# Patient Record
Sex: FEMALE | HISPANIC OR LATINO | URBAN - METROPOLITAN AREA
[De-identification: names, ages, dates, MRNs, and addresses within clinical notes are randomized per-mention and may not be internally consistent; named-entity substitution may affect disease eponyms.]

---

## 2017-11-09 ENCOUNTER — EMERGENCY (EMERGENCY)
Facility: HOSPITAL | Age: 32
LOS: 1 days | Discharge: OTHER INSTITUTION | End: 2017-11-09
Attending: EMERGENCY MEDICINE
Payer: COMMERCIAL

## 2017-11-09 ENCOUNTER — INPATIENT (INPATIENT)
Facility: HOSPITAL | Age: 32
LOS: 1 days | Discharge: HOME | DRG: 776 | End: 2017-11-11
Attending: SPECIALIST | Admitting: SPECIALIST
Payer: COMMERCIAL

## 2017-11-09 VITALS
DIASTOLIC BLOOD PRESSURE: 92 MMHG | SYSTOLIC BLOOD PRESSURE: 148 MMHG | RESPIRATION RATE: 16 BRPM | HEART RATE: 106 BPM | OXYGEN SATURATION: 98 %

## 2017-11-09 VITALS — DIASTOLIC BLOOD PRESSURE: 90 MMHG | SYSTOLIC BLOOD PRESSURE: 130 MMHG | RESPIRATION RATE: 18 BRPM | HEART RATE: 90 BPM

## 2017-11-09 VITALS
TEMPERATURE: 99 F | DIASTOLIC BLOOD PRESSURE: 101 MMHG | WEIGHT: 179.9 LBS | HEIGHT: 63 IN | HEART RATE: 130 BPM | SYSTOLIC BLOOD PRESSURE: 158 MMHG | RESPIRATION RATE: 18 BRPM | OXYGEN SATURATION: 97 %

## 2017-11-09 DIAGNOSIS — Z98.891 HISTORY OF UTERINE SCAR FROM PREVIOUS SURGERY: Chronic | ICD-10-CM

## 2017-11-09 DIAGNOSIS — O14.00 MILD TO MODERATE PRE-ECLAMPSIA, UNSPECIFIED TRIMESTER: ICD-10-CM

## 2017-11-09 DIAGNOSIS — O15.9 ECLAMPSIA, UNSPECIFIED AS TO TIME PERIOD: ICD-10-CM

## 2017-11-09 LAB
ALBUMIN SERPL ELPH-MCNC: 3.1 G/DL — LOW (ref 3.3–5.2)
ALBUMIN SERPL ELPH-MCNC: 3.1 G/DL — LOW (ref 3.3–5.2)
ALBUMIN SERPL ELPH-MCNC: 3.2 G/DL — LOW (ref 3.3–5.2)
ALBUMIN SERPL ELPH-MCNC: 3.5 G/DL — SIGNIFICANT CHANGE UP (ref 3.3–5.2)
ALP SERPL-CCNC: 125 U/L — HIGH (ref 40–120)
ALP SERPL-CCNC: 135 U/L — HIGH (ref 40–120)
ALP SERPL-CCNC: 137 U/L — HIGH (ref 40–120)
ALP SERPL-CCNC: 147 U/L — HIGH (ref 40–120)
ALT FLD-CCNC: 20 U/L — SIGNIFICANT CHANGE UP
ALT FLD-CCNC: 24 U/L — SIGNIFICANT CHANGE UP
ANION GAP SERPL CALC-SCNC: 12 MMOL/L — SIGNIFICANT CHANGE UP (ref 5–17)
ANION GAP SERPL CALC-SCNC: 15 MMOL/L — SIGNIFICANT CHANGE UP (ref 5–17)
ANION GAP SERPL CALC-SCNC: 15 MMOL/L — SIGNIFICANT CHANGE UP (ref 5–17)
ANION GAP SERPL CALC-SCNC: 18 MMOL/L — HIGH (ref 5–17)
AST SERPL-CCNC: 21 U/L — SIGNIFICANT CHANGE UP
AST SERPL-CCNC: 24 U/L — SIGNIFICANT CHANGE UP
AST SERPL-CCNC: 25 U/L — SIGNIFICANT CHANGE UP
AST SERPL-CCNC: 25 U/L — SIGNIFICANT CHANGE UP
BASOPHILS # BLD AUTO: 0 K/UL — SIGNIFICANT CHANGE UP (ref 0–0.2)
BASOPHILS NFR BLD AUTO: 0.2 % — SIGNIFICANT CHANGE UP (ref 0–2)
BASOPHILS NFR BLD AUTO: 0.2 % — SIGNIFICANT CHANGE UP (ref 0–2)
BILIRUB SERPL-MCNC: 0.2 MG/DL — LOW (ref 0.4–2)
BUN SERPL-MCNC: 12 MG/DL — SIGNIFICANT CHANGE UP (ref 8–20)
BUN SERPL-MCNC: 6 MG/DL — LOW (ref 8–20)
BUN SERPL-MCNC: 6 MG/DL — LOW (ref 8–20)
BUN SERPL-MCNC: 8 MG/DL — SIGNIFICANT CHANGE UP (ref 8–20)
CALCIUM SERPL-MCNC: 6.9 MG/DL — LOW (ref 8.6–10.2)
CALCIUM SERPL-MCNC: 7 MG/DL — LOW (ref 8.6–10.2)
CALCIUM SERPL-MCNC: 7.4 MG/DL — LOW (ref 8.6–10.2)
CALCIUM SERPL-MCNC: 8.4 MG/DL — LOW (ref 8.6–10.2)
CHLORIDE SERPL-SCNC: 100 MMOL/L — SIGNIFICANT CHANGE UP (ref 98–107)
CHLORIDE SERPL-SCNC: 101 MMOL/L — SIGNIFICANT CHANGE UP (ref 98–107)
CHLORIDE SERPL-SCNC: 102 MMOL/L — SIGNIFICANT CHANGE UP (ref 98–107)
CHLORIDE SERPL-SCNC: 102 MMOL/L — SIGNIFICANT CHANGE UP (ref 98–107)
CK SERPL-CCNC: 108 U/L — SIGNIFICANT CHANGE UP (ref 25–170)
CO2 SERPL-SCNC: 21 MMOL/L — LOW (ref 22–29)
CO2 SERPL-SCNC: 24 MMOL/L — SIGNIFICANT CHANGE UP (ref 22–29)
CO2 SERPL-SCNC: 25 MMOL/L — SIGNIFICANT CHANGE UP (ref 22–29)
CO2 SERPL-SCNC: 27 MMOL/L — SIGNIFICANT CHANGE UP (ref 22–29)
CREAT SERPL-MCNC: 0.42 MG/DL — LOW (ref 0.5–1.3)
CREAT SERPL-MCNC: 0.42 MG/DL — LOW (ref 0.5–1.3)
CREAT SERPL-MCNC: 0.44 MG/DL — LOW (ref 0.5–1.3)
CREAT SERPL-MCNC: 0.55 MG/DL — SIGNIFICANT CHANGE UP (ref 0.5–1.3)
EOSINOPHIL # BLD AUTO: 0 K/UL — SIGNIFICANT CHANGE UP (ref 0–0.5)
EOSINOPHIL # BLD AUTO: 0.1 K/UL — SIGNIFICANT CHANGE UP (ref 0–0.5)
EOSINOPHIL # BLD AUTO: 0.2 K/UL — SIGNIFICANT CHANGE UP (ref 0–0.5)
EOSINOPHIL NFR BLD AUTO: 0.5 % — SIGNIFICANT CHANGE UP (ref 0–6)
EOSINOPHIL NFR BLD AUTO: 1.4 % — SIGNIFICANT CHANGE UP (ref 0–6)
GLUCOSE SERPL-MCNC: 106 MG/DL — SIGNIFICANT CHANGE UP (ref 70–115)
GLUCOSE SERPL-MCNC: 116 MG/DL — HIGH (ref 70–115)
GLUCOSE SERPL-MCNC: 117 MG/DL — HIGH (ref 70–115)
GLUCOSE SERPL-MCNC: 132 MG/DL — HIGH (ref 70–115)
HCT VFR BLD CALC: 36.4 % — LOW (ref 37–47)
HCT VFR BLD CALC: 37.4 % — SIGNIFICANT CHANGE UP (ref 37–47)
HCT VFR BLD CALC: 39.5 % — SIGNIFICANT CHANGE UP (ref 37–47)
HGB BLD-MCNC: 12.6 G/DL — SIGNIFICANT CHANGE UP (ref 12–16)
HGB BLD-MCNC: 12.9 G/DL — SIGNIFICANT CHANGE UP (ref 12–16)
HGB BLD-MCNC: 13.8 G/DL — SIGNIFICANT CHANGE UP (ref 12–16)
LDH SERPL L TO P-CCNC: 299 U/L — HIGH (ref 98–192)
LYMPHOCYTES # BLD AUTO: 1.9 K/UL — SIGNIFICANT CHANGE UP (ref 1–4.8)
LYMPHOCYTES # BLD AUTO: 17 % — LOW (ref 20–55)
LYMPHOCYTES # BLD AUTO: 17 % — LOW (ref 20–55)
LYMPHOCYTES # BLD AUTO: 18.4 % — LOW (ref 20–55)
LYMPHOCYTES # BLD AUTO: 2.2 K/UL — SIGNIFICANT CHANGE UP (ref 1–4.8)
LYMPHOCYTES # BLD AUTO: 2.4 K/UL — SIGNIFICANT CHANGE UP (ref 1–4.8)
MAGNESIUM SERPL-MCNC: 4.2 MG/DL — HIGH (ref 1.6–2.6)
MAGNESIUM SERPL-MCNC: 4.8 MG/DL — HIGH (ref 1.6–2.6)
MCHC RBC-ENTMCNC: 31.1 PG — HIGH (ref 27–31)
MCHC RBC-ENTMCNC: 31.4 PG — HIGH (ref 27–31)
MCHC RBC-ENTMCNC: 31.5 PG — HIGH (ref 27–31)
MCHC RBC-ENTMCNC: 34.5 G/DL — SIGNIFICANT CHANGE UP (ref 32–36)
MCHC RBC-ENTMCNC: 34.6 G/DL — SIGNIFICANT CHANGE UP (ref 32–36)
MCHC RBC-ENTMCNC: 34.9 G/DL — SIGNIFICANT CHANGE UP (ref 32–36)
MCV RBC AUTO: 89.8 FL — SIGNIFICANT CHANGE UP (ref 81–99)
MCV RBC AUTO: 90.1 FL — SIGNIFICANT CHANGE UP (ref 81–99)
MCV RBC AUTO: 91 FL — SIGNIFICANT CHANGE UP (ref 81–99)
MONOCYTES # BLD AUTO: 0.7 K/UL — SIGNIFICANT CHANGE UP (ref 0–0.8)
MONOCYTES # BLD AUTO: 1 K/UL — HIGH (ref 0–0.8)
MONOCYTES # BLD AUTO: 1.2 K/UL — HIGH (ref 0–0.8)
MONOCYTES NFR BLD AUTO: 6.6 % — SIGNIFICANT CHANGE UP (ref 3–10)
MONOCYTES NFR BLD AUTO: 8 % — SIGNIFICANT CHANGE UP (ref 3–10)
MONOCYTES NFR BLD AUTO: 9.2 % — SIGNIFICANT CHANGE UP (ref 3–10)
NEUTROPHILS # BLD AUTO: 8.2 K/UL — HIGH (ref 1.8–8)
NEUTROPHILS # BLD AUTO: 9.2 K/UL — HIGH (ref 1.8–8)
NEUTROPHILS # BLD AUTO: 9.6 K/UL — HIGH (ref 1.8–8)
NEUTROPHILS NFR BLD AUTO: 71.3 % — SIGNIFICANT CHANGE UP (ref 37–73)
NEUTROPHILS NFR BLD AUTO: 73.4 % — HIGH (ref 37–73)
NEUTROPHILS NFR BLD AUTO: 74 % — HIGH (ref 37–73)
PLAT MORPH BLD: NORMAL — SIGNIFICANT CHANGE UP
PLATELET # BLD AUTO: 321 K/UL — SIGNIFICANT CHANGE UP (ref 150–400)
PLATELET # BLD AUTO: 328 K/UL — SIGNIFICANT CHANGE UP (ref 150–400)
PLATELET # BLD AUTO: 371 K/UL — SIGNIFICANT CHANGE UP (ref 150–400)
POTASSIUM SERPL-MCNC: 3.2 MMOL/L — LOW (ref 3.5–5.3)
POTASSIUM SERPL-MCNC: 3.2 MMOL/L — LOW (ref 3.5–5.3)
POTASSIUM SERPL-MCNC: 3.4 MMOL/L — LOW (ref 3.5–5.3)
POTASSIUM SERPL-MCNC: 3.6 MMOL/L — SIGNIFICANT CHANGE UP (ref 3.5–5.3)
POTASSIUM SERPL-SCNC: 3.2 MMOL/L — LOW (ref 3.5–5.3)
POTASSIUM SERPL-SCNC: 3.2 MMOL/L — LOW (ref 3.5–5.3)
POTASSIUM SERPL-SCNC: 3.4 MMOL/L — LOW (ref 3.5–5.3)
POTASSIUM SERPL-SCNC: 3.6 MMOL/L — SIGNIFICANT CHANGE UP (ref 3.5–5.3)
PROT SERPL-MCNC: 6.6 G/DL — SIGNIFICANT CHANGE UP (ref 6.6–8.7)
PROT SERPL-MCNC: 7.1 G/DL — SIGNIFICANT CHANGE UP (ref 6.6–8.7)
RBC # BLD: 4 M/UL — LOW (ref 4.4–5.2)
RBC # BLD: 4.15 M/UL — LOW (ref 4.4–5.2)
RBC # BLD: 4.4 M/UL — SIGNIFICANT CHANGE UP (ref 4.4–5.2)
RBC # FLD: 12.9 % — SIGNIFICANT CHANGE UP (ref 11–15.6)
RBC # FLD: 12.9 % — SIGNIFICANT CHANGE UP (ref 11–15.6)
RBC # FLD: 13 % — SIGNIFICANT CHANGE UP (ref 11–15.6)
RBC BLD AUTO: NORMAL — SIGNIFICANT CHANGE UP
SODIUM SERPL-SCNC: 140 MMOL/L — SIGNIFICANT CHANGE UP (ref 135–145)
SODIUM SERPL-SCNC: 140 MMOL/L — SIGNIFICANT CHANGE UP (ref 135–145)
SODIUM SERPL-SCNC: 141 MMOL/L — SIGNIFICANT CHANGE UP (ref 135–145)
SODIUM SERPL-SCNC: 141 MMOL/L — SIGNIFICANT CHANGE UP (ref 135–145)
URATE SERPL-MCNC: 7.6 MG/DL — HIGH (ref 2.4–5.7)
URATE SERPL-MCNC: 8.7 MG/DL — HIGH (ref 2.4–5.7)
VARIANT LYMPHS # BLD: 1 % — SIGNIFICANT CHANGE UP (ref 0–6)
WBC # BLD: 11.1 K/UL — HIGH (ref 4.8–10.8)
WBC # BLD: 12.9 K/UL — HIGH (ref 4.8–10.8)
WBC # BLD: 12.9 K/UL — HIGH (ref 4.8–10.8)
WBC # FLD AUTO: 11.1 K/UL — HIGH (ref 4.8–10.8)
WBC # FLD AUTO: 12.9 K/UL — HIGH (ref 4.8–10.8)
WBC # FLD AUTO: 12.9 K/UL — HIGH (ref 4.8–10.8)

## 2017-11-09 PROCEDURE — 99222 1ST HOSP IP/OBS MODERATE 55: CPT

## 2017-11-09 PROCEDURE — 83615 LACTATE (LD) (LDH) ENZYME: CPT

## 2017-11-09 PROCEDURE — 93005 ELECTROCARDIOGRAM TRACING: CPT

## 2017-11-09 PROCEDURE — 99053 MED SERV 10PM-8AM 24 HR FAC: CPT

## 2017-11-09 PROCEDURE — 82550 ASSAY OF CK (CPK): CPT

## 2017-11-09 PROCEDURE — 80053 COMPREHEN METABOLIC PANEL: CPT

## 2017-11-09 PROCEDURE — 85027 COMPLETE CBC AUTOMATED: CPT

## 2017-11-09 PROCEDURE — 36415 COLL VENOUS BLD VENIPUNCTURE: CPT

## 2017-11-09 PROCEDURE — 99285 EMERGENCY DEPT VISIT HI MDM: CPT | Mod: 25

## 2017-11-09 PROCEDURE — 93010 ELECTROCARDIOGRAM REPORT: CPT

## 2017-11-09 PROCEDURE — 84550 ASSAY OF BLOOD/URIC ACID: CPT

## 2017-11-09 PROCEDURE — 96374 THER/PROPH/DIAG INJ IV PUSH: CPT

## 2017-11-09 PROCEDURE — 83735 ASSAY OF MAGNESIUM: CPT

## 2017-11-09 RX ORDER — MAGNESIUM SULFATE 500 MG/ML
2 VIAL (ML) INJECTION
Qty: 40 | Refills: 0 | Status: DISCONTINUED | OUTPATIENT
Start: 2017-11-09 | End: 2017-11-10

## 2017-11-09 RX ORDER — LABETALOL HCL 100 MG
20 TABLET ORAL ONCE
Qty: 0 | Refills: 0 | Status: COMPLETED | OUTPATIENT
Start: 2017-11-09 | End: 2017-11-09

## 2017-11-09 RX ORDER — ACETAMINOPHEN 500 MG
650 TABLET ORAL EVERY 6 HOURS
Qty: 0 | Refills: 0 | Status: DISCONTINUED | OUTPATIENT
Start: 2017-11-09 | End: 2017-11-11

## 2017-11-09 RX ORDER — SODIUM CHLORIDE 9 MG/ML
3 INJECTION INTRAMUSCULAR; INTRAVENOUS; SUBCUTANEOUS ONCE
Qty: 0 | Refills: 0 | Status: DISCONTINUED | OUTPATIENT
Start: 2017-11-09 | End: 2017-11-11

## 2017-11-09 RX ORDER — SODIUM CHLORIDE 9 MG/ML
1000 INJECTION, SOLUTION INTRAVENOUS
Qty: 0 | Refills: 0 | Status: DISCONTINUED | OUTPATIENT
Start: 2017-11-09 | End: 2017-11-11

## 2017-11-09 RX ORDER — MAGNESIUM SULFATE 500 MG/ML
2 VIAL (ML) INJECTION
Qty: 40 | Refills: 0 | Status: DISCONTINUED | OUTPATIENT
Start: 2017-11-09 | End: 2017-11-09

## 2017-11-09 RX ADMIN — Medication 650 MILLIGRAM(S): at 21:22

## 2017-11-09 RX ADMIN — SODIUM CHLORIDE 100 MILLILITER(S): 9 INJECTION, SOLUTION INTRAVENOUS at 14:19

## 2017-11-09 RX ADMIN — Medication 650 MILLIGRAM(S): at 22:20

## 2017-11-09 RX ADMIN — Medication 50 GM/HR: at 06:15

## 2017-11-09 RX ADMIN — SODIUM CHLORIDE 100 MILLILITER(S): 9 INJECTION, SOLUTION INTRAVENOUS at 06:15

## 2017-11-09 RX ADMIN — Medication 20 MILLIGRAM(S): at 05:48

## 2017-11-09 NOTE — CONSULT NOTE ADULT - SUBJECTIVE AND OBJECTIVE BOX
[facility]                                     Neurology at [place]                                 Cornelio Casanova, & Adithya                                  [address]                                        [place], [place], [address]                                             [phone]    HISTORY:    The patient is a 32y Female who is 6 days post-op  who presented with two seizures, both GTC, lasting 60-90 seconds with 10 minutes in between them.  She was confused and had urinary incontinence.  She has no prior history of seizures or family history of epilepsy.  The seizures responded to magnesium.  Neuro eval is requested.    PAST MEDICAL & SURGICAL HISTORY:   delivery delivered: one week prior  S/P : , ,       MEDICATIONS  (STANDING):  lactated ringers. 1000 milliLiter(s) (100 mL/Hr) IV Continuous <Continuous>  magnesium sulfate Infusion 2 Gm/Hr (50 mL/Hr) IV Continuous <Continuous>    MEDICATIONS  (PRN):    Allergies    No Known Allergies    Intolerances    none reported    SOCIAL HISTORY:  no etoh/tob/etoh    FAMILY HISTORY:  No pertinent family history in first degree relatives      ROS:  The patient denies fevers or weight changes.  Denies headache or dizziness.  Denies chest pain.  Denies shortness of breath.  Denies abdominal pain, nausea, or vomiting.  Denies change in urinary pattern.  Denies rash.  Denies recent mood changes.    Exam:  Vital Signs Last 24 Hrs  T(C): 37.1 (2017 13:52), Max: 37.9 (2017 12:15)  T(F): 98.7 (2017 13:52), Max: 100.2 (2017 12:15)  HR: 101 (2017 13:52) (85 - 130)  BP: 137/92 (2017 13:52) (127/79 - 158/101)  BP(mean): --  RR: 18 (2017 13:52) (16 - 18)  SpO2: 98% (2017 13:52) (97% - 98%)  General: NAD    Mental status: The patient is awake, alert, and fully oriented. There is no aphasia.    Cranial nerves: . Pupils react Symmetrically to light. There is no visual field deficit to confrontation. Extraocular motion is full with no nystagmus. There is no ptosis. Facial sensation is intact. Facial musculature is symmetric. Palate elevates symmetrically. Tongue is midline.    Motor: There is normal bulk and tone.  Strength is 5/5 in the right arm and leg.   Strength is 5/5 in the left arm and leg.    Sensation: Intact to light touch in 4 ext.    Reflexes: 1+ throughout.    Cerebellar: There is no dysmetria on finger to nose testing.    LABS:                         12.9   12.9  )-----------( 321      ( 2017 11:53 )             37.4           141  |  102  |  8.0  ----------------------------<  106  3.6   |  27.0  |  0.44<L>    Ca    7.4<L>      2017 11:53  Mg     4.2         TPro  6.6  /  Alb  3.1<L>  /  TBili  0.2<L>  /  DBili  x   /  AST  21  /  ALT  20  /  AlkPhos  125<H>

## 2017-11-09 NOTE — H&P ADULT - HISTORY OF PRESENT ILLNESS
Patient is a 31yo  now POD#9 s/p repeat  section with bilateral tubal ligation   S:    The patient presents to ED after having seizure at home in bed per . He woke up to her shaking for about 90 seconds and she had blood coming out of mouth from biting tongue. He called EMS immediately and they arrived 5 min ago. Patient had additional seizure while transferring her into ambulance and was given Magnesium 4mg. She had elevated BP 140s-160s in the ED.   Prenatal course and hospital stay uncomplicated. She has never had an issue with blood pressure, seizures, or diabetes in this pregnancy or previous. She delivered 10/31 and was discharged home .   Denies abdominal pain, confusion, headache, vision changes.

## 2017-11-09 NOTE — CONSULT NOTE ADULT - ASSESSMENT
The patient is a 32y Female with seizure, likely due to eclampsia.  No evidence for seizure disorder currently.  Will check EEG.  continue magnesium per OB for eclampsia.  will follow    Spencer Grimes MD, PhD   [id number]

## 2017-11-09 NOTE — ED PROVIDER NOTE - OBJECTIVE STATEMENT
33 yo F, who recently had a  to a FT baby (no complications) p/w new onset seizure.   woke up to her having a tonic clonic seizure of unknown duration.  no hx of pre-eclampsia or eclampsia per .   states pt had lower extremity edema a couple of days ago which recently resolved.

## 2017-11-09 NOTE — ED ADULT NURSE REASSESSMENT NOTE - NS ED NURSE REASSESS COMMENT FT1
sirkanth received awake and alert - can remember month - not year - remembers her  and vaginal birth cannot remember if child was a boy or girl - knows that she is in the hospital at the time. patient with warm skin elevated HR - skin red to touch saline lock placed to left ac by ems - patient received magnesium enroute.   Dr Ochoa called [facility] to labor and delivery - MD Silveira at bedside - states that patietn is to leave now - patient received one dose of labetalol IV push. MD states that patient doesn't need a rectal temp at this time.   blood sent to lab srikanth received awake and alert - can remember month - not year - remembers her  and c section birth cannot remember if child was a boy or girl - knows that she is in the hospital at the time. patient with warm skin elevated HR - skin red to touch saline lock placed to left ac by ems - patient received magnesium enroute.   Dr Ochoa called [facility] to labor and delivery - MD Silveira at bedside - states that patient is to leave now - patient received one dose of labetalol IV push. MD states that patient doesn't need a rectal temp at this time.   blood sent to lab

## 2017-11-09 NOTE — H&P ADULT - NSHPLABSRESULTS_GEN_ALL_CORE
Labs:   13.8   11.1  )-----------( 328      ( 09 Nov 2017 05:52 )             39.5   11-09    141  |  102  |  12.0  ----------------------------<  132<H>  3.2<L>   |  21.0<L>  |  0.55    Ca    8.4<L>      09 Nov 2017 05:52  Mg     3.4     11-09    TPro  7.1  /  Alb  3.5  /  TBili  0.2<L>  /  DBili  x   /  AST  24  /  ALT  24  /  AlkPhos  137<H>  11-09  Uric acid 8.7

## 2017-11-09 NOTE — ED ADULT NURSE NOTE - OBJECTIVE STATEMENT
patient received in room A 5- patient awake and alert oriented to  year and date of birth and the fact that she had a baby, patient cannot recall if she had a boy or girl.  states that he woke up to find wife shaking in bed - states that two days ago she was having a migraine and he bought over the counter aspirin, and yesterday patient called  complaining that she was having a fever.  also states that she had swelling to her legs three days ago  [facility] - called by ER MD - staff from OB at bedside patient with skin hot and flushed - Rn placed saline lock to right ac, obtained bloods -  and patient verified name. Dr Silveira from OB  refused to have rectal temp obtained at this time. RN was able to medicate patient with labetolol prior to being transferred to OB patient received in room A 5- patient awake and alert oriented to , cannot recall year but knows month, date of birth and the fact that she had a baby, patient cannot recall if she had a boy or girl.  states that he woke up to find wife shaking in bed - states that two days ago she was having a migraine and he bought over the counter aspirin, and yesterday patient called  complaining that she was having a fever.  also states that she had swelling to her legs three days ago  [facility] - called by ER MD - staff from OB at bedside patient with skin hot and flushed - Rn placed saline lock to right ac, obtained bloods -  and patient verified name. Dr Silveira from OB  refused to have rectal temp obtained at this time. RN was able to medicate patient with labetolol prior to being transferred to OB

## 2017-11-09 NOTE — ED ADULT TRIAGE NOTE - CHIEF COMPLAINT QUOTE
pt biba a+ox3 s/p two witnessed seizures tonight.  pt has no hx of seizures.  8 days ago had  delivery of full term baby with no complications.  ems gave 4mg of Mg   bite marks and blood noted on tip of tongue. pt biba a+ox3 s/p two witnessed seizures tonight.  pt has no hx of seizures.  8 days ago had  delivery of full term baby with no complications.  ems gave 4grams of Mg   bite marks and blood noted on tip of tongue.

## 2017-11-09 NOTE — H&P ADULT - ASSESSMENT
A/P:  POD# A/P: Patient is a 33yo  now POD#9 s/p repeat  section with bilateral tubal ligation   -Stable   -Postictal state will consult neuro today   -BPs within normal range, on magnesium   -MFM consult pending today

## 2017-11-09 NOTE — ED PROVIDER NOTE - ENMT, MLM
Airway patent, Nasal mucosa clear. Mouth with normal mucosa.  +multiple tongue lacerations - no active bleeding; Throat has no vesicles, no oropharyngeal exudates and uvula is midline.

## 2017-11-09 NOTE — ED ADULT NURSE NOTE - CHIEF COMPLAINT QUOTE
pt biba a+ox3 s/p two witnessed seizures tonight.  pt has no hx of seizures.  8 days ago had  delivery of full term baby with no complications.  ems gave 4grams of Mg   bite marks and blood noted on tip of tongue.

## 2017-11-10 LAB — MAGNESIUM SERPL-MCNC: 5.6 MG/DL — HIGH (ref 1.6–2.6)

## 2017-11-10 PROCEDURE — T1013: CPT

## 2017-11-10 PROCEDURE — 36415 COLL VENOUS BLD VENIPUNCTURE: CPT

## 2017-11-10 PROCEDURE — 85027 COMPLETE CBC AUTOMATED: CPT

## 2017-11-10 PROCEDURE — 95819 EEG AWAKE AND ASLEEP: CPT

## 2017-11-10 PROCEDURE — 84550 ASSAY OF BLOOD/URIC ACID: CPT

## 2017-11-10 PROCEDURE — 80053 COMPREHEN METABOLIC PANEL: CPT

## 2017-11-10 PROCEDURE — 83735 ASSAY OF MAGNESIUM: CPT

## 2017-11-10 RX ADMIN — SODIUM CHLORIDE 100 MILLILITER(S): 9 INJECTION, SOLUTION INTRAVENOUS at 00:23

## 2017-11-10 RX ADMIN — Medication 50 GM/HR: at 00:25

## 2017-11-11 VITALS
HEART RATE: 70 BPM | RESPIRATION RATE: 20 BRPM | SYSTOLIC BLOOD PRESSURE: 142 MMHG | DIASTOLIC BLOOD PRESSURE: 84 MMHG | TEMPERATURE: 99 F

## 2017-11-11 NOTE — DISCHARGE NOTE OB - PATIENT PORTAL LINK FT
“You can access the Follow[employer] Patient Portal, offered by [facility], by registering with the following website: [email or web address]”

## 2017-11-11 NOTE — PROGRESS NOTE ADULT - ASSESSMENT
The patient is a 32y Female with isolated seizures due to eclampsia.  Her EEG was negative, I would not start her on anti-epiletic meds at this time.  Can follow up and we can arrange for outpatient MRI brain to complete epilepsy work up, however I think it is not likely.    Spencer Grimes MD, PhD   [id number]
Pt feeling well without symptoms. DC mag this morning.     Starting soft diet because of swollen tongue.
Pt feeling well without symptoms. Patient would like to go home.

## 2017-11-11 NOTE — DISCHARGE NOTE OB - CARE PLAN
Principal Discharge DX:	Eclampsia, postpartum condition or complication  Goal:	stabilization and recovery  Instructions for follow-up, activity and diet:	Please transition to regular activity level. Please followup with your doctor as outpatient on MOnday.

## 2017-11-11 NOTE — DISCHARGE NOTE OB - PLAN OF CARE
stabilization and recovery Please transition to regular activity level. Please followup with your doctor as outpatient on MOnday.

## 2017-11-11 NOTE — DISCHARGE NOTE OB - HOSPITAL COURSE
31yo  s/p repeat  delivery at [place], admitted for eclampsia treatment. Hospital course was uncomplicated. Blood pressures remained within normal limits, without requiring medications. At the time of discharge pt was hemodynamically stable, tolerating a liquid diet, voiding, ambulating and pain was well controlled with PRN medications.

## 2017-11-11 NOTE — PROGRESS NOTE ADULT - PROBLEM SELECTOR PLAN 1
1. Pt off magnesium. Vital signs has been stable, BPs have been wnl  2. Continue with salt/water gargles for swollen tongue  3. Neuro: pain is well controlled with PRNs  4. DVT ppx: ambulation  5. Discharge home today with followup at [place] on Monday.

## 2017-11-11 NOTE — PROGRESS NOTE ADULT - SUBJECTIVE AND OBJECTIVE BOX
31 yo  s/p rCS and eclamptic seizure. Finished 24 hrs mag this morning.     Denies HA, changes in vision      2+ reflexes   tongue swollen, less so than yesterday    ICU Vital Signs Last 24 Hrs    T(C): 37 (10 Nov 2017 05:50), Max: 37.9 (2017 12:15)  T(F): 98.6 (10 Nov 2017 05:50), Max: 100.2 (2017 12:15)  HR: 86 (10 Nov 2017 05:50) (79 - 102)  BP: 133/89 (10 Nov 2017 05:50) (127/79 - 144/86)  RR: 18 (10 Nov 2017 03:56) (16 - 20)  SpO2: 97% (10 Nov 2017 05:50) (97% - 99%)
33 yo  s/p rCS and eclamptic seizure. Finished 24 hrs mag yesterday.     Patient was seen and examined at bedside. No acute events overnight. Patient denies HA, RUQ pain, visual disturbances, fevers, chills. Patient reports her tongue is still swollen.     Vital Signs Last 24 Hrs  T(C): 37.1 (2017 09:00), Max: 37.3 (2017 00:24)  T(F): 98.8 (2017 09:00), Max: 99.2 (2017 00:24)  HR: 70 (:00) (64 - 84)  BP: 142/84 (2017 09:00) (133/77 - 142/84)  RR: 20 (2017 09:00) (18 - 20)    General: NAD  Lungs: CTAB  Heart: RRR  Abdomen: soft, non-tender, non-distended, +BS  Neuro: + Reflexes
[facility]                                     Neurology at [place]                                 Cornelio Casanova & Adithya                                  [address]                                        [place], [place], [address]                                             [phone]      Vital signs:  T(C): 37.2 (11-10-17 @ 15:50), Max: 37.2 (11-09-17 @ 20:20)  HR: 77 (11-10-17 @ 15:50) (77 - 93)  BP: 134/82 (11-10-17 @ 16:30) (130/87 - 141/82)  RR: 20 (11-10-17 @ 15:50) (18 - 20)  SpO2: 97% (11-10-17 @ 05:50) (97% - 99%)  Wt(kg): --    Exam:    No further seizures  No new complaints.  Awake and alert.  speech language intact  Pupils react.  Face symmetric smile and sensation  hearing symmetric  tongue ML  5/5 power in 4 ext  no drift  intact FT x 4 ext    EEG did not show any evidence of seizure activity

## 2017-11-11 NOTE — DISCHARGE NOTE OB - CARE PROVIDER_API CALL
Erika Roman (MD), [facility] Obstetrics and Gynecology  [facility] [place] [address]  Phone: [phone]  Fax: [phone]

## 2018-04-09 PROBLEM — Z00.00 ENCOUNTER FOR PREVENTIVE HEALTH EXAMINATION: Status: ACTIVE | Noted: 2018-04-09

## 2023-06-09 NOTE — ED PROVIDER NOTE - CHPI ED SYMPTOMS POS
Patient is hallucinating-blaming PCA that they are hitting her, patient also reports that PCAs are stealing her baby and trying to kill the baby  Patient is continuously banging her head with her hand to get rid of some thoughts in her mind  Patient also interfering with medical treatment-by refusing and not cooperating with medical staff  Patient reported she has been tortured by God for last 3 years-she wants to eat regular food, when was still that we are giving  But for safety, patient started screaming and banging her head with her hand  Per nurse's note, overnight patient was screaming, choking herself, and saying she was going to kill herself  She was also trying to bite herself and  punching herself in the head and stomach  We will and reconsulted psychiatry for more recommendation    If psychiatry agrees, will try to adjust medication, either increase Seroquel or Depakote dose DISORIENTATION/SEIZURE/CONFUSION